# Patient Record
Sex: FEMALE | Race: WHITE | NOT HISPANIC OR LATINO | Employment: PART TIME | URBAN - METROPOLITAN AREA
[De-identification: names, ages, dates, MRNs, and addresses within clinical notes are randomized per-mention and may not be internally consistent; named-entity substitution may affect disease eponyms.]

---

## 2022-07-23 ENCOUNTER — HOSPITAL ENCOUNTER (EMERGENCY)
Facility: HOSPITAL | Age: 22
Discharge: HOME | End: 2022-07-23
Attending: EMERGENCY MEDICINE
Payer: COMMERCIAL

## 2022-07-23 VITALS
HEART RATE: 111 BPM | TEMPERATURE: 98.1 F | WEIGHT: 153.22 LBS | DIASTOLIC BLOOD PRESSURE: 85 MMHG | OXYGEN SATURATION: 99 % | SYSTOLIC BLOOD PRESSURE: 126 MMHG | RESPIRATION RATE: 16 BRPM

## 2022-07-23 DIAGNOSIS — T78.40XA ALLERGIC REACTION, INITIAL ENCOUNTER: Primary | ICD-10-CM

## 2022-07-23 LAB
EXT PREG TEST URINE: NEGATIVE
EXT. CONTROL ED NAV: NORMAL

## 2022-07-23 PROCEDURE — 99283 EMERGENCY DEPT VISIT LOW MDM: CPT

## 2022-07-23 PROCEDURE — 81025 URINE PREGNANCY TEST: CPT | Performed by: PHYSICIAN ASSISTANT

## 2022-07-23 PROCEDURE — 99284 EMERGENCY DEPT VISIT MOD MDM: CPT | Performed by: PHYSICIAN ASSISTANT

## 2022-07-23 PROCEDURE — 93005 ELECTROCARDIOGRAM TRACING: CPT

## 2022-07-23 RX ORDER — FAMOTIDINE 20 MG/1
20 TABLET, FILM COATED ORAL 2 TIMES DAILY
Qty: 30 TABLET | Refills: 0 | Status: SHIPPED | OUTPATIENT
Start: 2022-07-23

## 2022-07-23 RX ORDER — FAMOTIDINE 20 MG/1
20 TABLET, FILM COATED ORAL ONCE
Status: COMPLETED | OUTPATIENT
Start: 2022-07-23 | End: 2022-07-23

## 2022-07-23 RX ORDER — DIPHENHYDRAMINE HCL 25 MG
25 TABLET ORAL EVERY 6 HOURS
Qty: 20 TABLET | Refills: 0 | Status: SHIPPED | OUTPATIENT
Start: 2022-07-23

## 2022-07-23 RX ADMIN — FAMOTIDINE 20 MG: 20 TABLET ORAL at 16:22

## 2022-07-23 RX ADMIN — DEXAMETHASONE SODIUM PHOSPHATE 10 MG: 10 INJECTION, SOLUTION INTRAMUSCULAR; INTRAVENOUS at 16:22

## 2022-07-23 NOTE — ED PROVIDER NOTES
History  Chief Complaint   Patient presents with    Allergic Reaction     Pt stated she was drinking coffee and she may have put almond milk in there by accident  Pt states she allergic to almonds  Pt c/o chest heaviness, "intermittent pins and needles throughout her body"     Patient presents to emergency department after she feels coffee she got from [employer] was contaminated with almond milk  She states as soon as she started drinking and she started to feel tingling to her lips and face  She developed diffuse hives answered to feel itchy  She states that her chest felt a little tight but no oral swelling  She has history of tree nut allergy and egg allergy  She took Benadryl right away  She did not need to use her EpiPen  Patient states that the hives are starting to go away  Since using the Benadryl  None       No past medical history on file  No past surgical history on file  No family history on file  I have reviewed and agree with the history as documented  No existing history information found  No existing history information found  Review of Systems   Constitutional: Negative for fever  HENT: Negative for congestion  Respiratory: Positive for chest tightness  Cardiovascular: Negative  Gastrointestinal: Negative  Skin: Positive for rash  All other systems reviewed and are negative  Physical Exam  Physical Exam  Vitals and nursing note reviewed  Constitutional:       Appearance: She is well-developed  HENT:      Head: Normocephalic and atraumatic  Right Ear: External ear normal       Left Ear: External ear normal       Mouth/Throat:      Mouth: Mucous membranes are moist       Comments: No oral edema  Eyes:      Conjunctiva/sclera: Conjunctivae normal    Cardiovascular:      Rate and Rhythm: Normal rate and regular rhythm  Heart sounds: Normal heart sounds     Pulmonary:      Effort: Pulmonary effort is normal       Breath sounds: Normal breath sounds  Abdominal:      General: Bowel sounds are normal       Palpations: Abdomen is soft  Musculoskeletal:         General: Normal range of motion  Cervical back: Neck supple  Lymphadenopathy:      Cervical: No cervical adenopathy  Skin:     General: Skin is warm  Comments: Resolving urticaria to patient's forearm   Neurological:      Mental Status: She is alert     Psychiatric:         Behavior: Behavior normal          Vital Signs  ED Triage Vitals [07/23/22 1516]   Temperature Pulse Respirations Blood Pressure SpO2   98 1 °F (36 7 °C) (!) 111 16 126/85 99 %      Temp Source Heart Rate Source Patient Position - Orthostatic VS BP Location FiO2 (%)   Oral Monitor Sitting Left arm --      Pain Score       --           Vitals:    07/23/22 1516   BP: 126/85   Pulse: (!) 111   Patient Position - Orthostatic VS: Sitting         Visual Acuity      ED Medications  Medications   dexamethasone oral liquid 10 mg 1 mL (10 mg Oral Given 7/23/22 1622)   famotidine (PEPCID) tablet 20 mg (20 mg Oral Given 7/23/22 1622)       Diagnostic Studies  Results Reviewed     Procedure Component Value Units Date/Time    POCT pregnancy, urine [[id number]]  (Normal) Resulted: 07/23/22 1620    Lab Status: Final result Updated: 07/23/22 1624     EXT PREG TEST UR (Ref: Negative) negative     Control valid                 No orders to display              Procedures  ECG 12 Lead Documentation Only    Date/Time: 7/23/2022 4:54 PM  Performed by: Ruslan Fitch PA-C  Authorized by: Ruslan Fitch PA-C     Indications / Diagnosis:  Chest tightness  Patient location:  ED  Previous ECG:     Previous ECG:  Unavailable  Rate:     ECG rate:  103    ECG rate assessment: tachycardic    Rhythm:     Rhythm: sinus tachycardia    Ectopy:     Ectopy: none    QRS:     QRS axis:  Normal  Conduction:     Conduction: normal    ST segments:     ST segments:  Normal  T waves:     T waves: normal               ED Course  ED Course as of 07/23/22 1656   Sat Jul 23, 2022   1651 Pt feeling better, all symptoms resolved  Instructions reviewed  CRAFFT    Flowsheet Row Most Recent Value   SBIRT (13-23 yo)    In order to provide better care to our patients, we are screening all of our patients for alcohol and drug use  Would it be okay to ask you these screening questions? No Filed at: 07/23/2022 1625                                          MDM  Number of Diagnoses or Management Options  Allergic reaction, initial encounter: new and does not require workup  Risk of Complications, Morbidity, and/or Mortality  General comments: All of patient's symptoms resolved she is feeling much better instructions reviewed  Patient Progress  Patient progress: improved      Disposition  Final diagnoses: Allergic reaction, initial encounter - tree nuts     Time reflects when diagnosis was documented in both MDM as applicable and the Disposition within this note     Time User Action Codes Description Comment    7/23/2022  4:51 PM Madhavi Mandujano Add [T78 40XA] Allergic reaction, initial encounter     7/23/2022  4:51 PM Madhavi Mandujano Modify [T78 40XA] Allergic reaction, initial encounter tree nuts      ED Disposition     ED Disposition   Discharge    Condition   Stable    Date/Time   Sat Jul 23, 2022  4:51 PM    [address]ung[address] K[address] discharge to home/self care                 Follow-up Information     Follow up With Specialties Details Why Contact Info    Maribel Chapa MD Pediatrics   [address]            Patient's Medications   Discharge Prescriptions    DIPHENHYDRAMINE (BENADRYL) 25 MG TABLET    Take 1 tablet (25 mg total) by mouth every 6 (six) hours       Start Date: 7/23/2022 End Date: --       Order Dose: 25 mg       Quantity: 20 tablet    Refills: 0    FAMOTIDINE (PEPCID) 20 MG TABLET    Take 1 tablet (20 mg total) by mouth 2 (two) times a day       Start Date: 7/23/2022 End Date: -- Order Dose: 20 mg       Quantity: 30 tablet    Refills: 0       No discharge procedures on file      PDMP Review     None          ED Provider  Electronically Signed by           Celestino Barajas PA-C  07/23/22 5947

## 2022-07-24 LAB
ATRIAL RATE: 102 BPM
P AXIS: 71 DEGREES
PR INTERVAL: 140 MS
QRS AXIS: 81 DEGREES
QRSD INTERVAL: 94 MS
QT INTERVAL: 384 MS
QTC INTERVAL: 500 MS
T WAVE AXIS: 55 DEGREES
VENTRICULAR RATE: 102 BPM

## 2022-07-24 PROCEDURE — 93010 ELECTROCARDIOGRAM REPORT: CPT
